# Patient Record
Sex: MALE | Race: BLACK OR AFRICAN AMERICAN | NOT HISPANIC OR LATINO | Employment: FULL TIME | URBAN - METROPOLITAN AREA
[De-identification: names, ages, dates, MRNs, and addresses within clinical notes are randomized per-mention and may not be internally consistent; named-entity substitution may affect disease eponyms.]

---

## 2017-10-21 ENCOUNTER — HOSPITAL ENCOUNTER (EMERGENCY)
Facility: HOSPITAL | Age: 47
Discharge: HOME | End: 2017-10-21
Attending: EMERGENCY MEDICINE

## 2017-10-21 VITALS
HEART RATE: 58 BPM | SYSTOLIC BLOOD PRESSURE: 228 MMHG | TEMPERATURE: 99 F | WEIGHT: 185 LBS | RESPIRATION RATE: 18 BRPM | HEIGHT: 68 IN | DIASTOLIC BLOOD PRESSURE: 122 MMHG | OXYGEN SATURATION: 98 % | BODY MASS INDEX: 28.04 KG/M2

## 2017-10-21 DIAGNOSIS — S61.214A LACERATION OF RIGHT RING FINGER WITHOUT FOREIGN BODY WITHOUT DAMAGE TO NAIL, INITIAL ENCOUNTER: Primary | ICD-10-CM

## 2017-10-21 DIAGNOSIS — S62.639A CLOSED FRACTURE OF TUFT OF DISTAL PHALANX OF FINGER: ICD-10-CM

## 2017-10-21 DIAGNOSIS — S67.10XA CRUSH INJURY TO FINGER, INITIAL ENCOUNTER: ICD-10-CM

## 2017-10-21 PROCEDURE — 96372 THER/PROPH/DIAG INJ SC/IM: CPT

## 2017-10-21 PROCEDURE — 63600175 PHARM REV CODE 636 W HCPCS: Performed by: EMERGENCY MEDICINE

## 2017-10-21 PROCEDURE — 25000003 PHARM REV CODE 250: Performed by: EMERGENCY MEDICINE

## 2017-10-21 PROCEDURE — 99283 EMERGENCY DEPT VISIT LOW MDM: CPT | Mod: 25

## 2017-10-21 PROCEDURE — 12041 INTMD RPR N-HF/GENIT 2.5CM/<: CPT | Mod: F8

## 2017-10-21 RX ORDER — HYDROCODONE BITARTRATE AND ACETAMINOPHEN 7.5; 325 MG/1; MG/1
1 TABLET ORAL EVERY 6 HOURS PRN
Qty: 12 TABLET | Refills: 0 | Status: SHIPPED | OUTPATIENT
Start: 2017-10-21 | End: 2017-11-30

## 2017-10-21 RX ORDER — CEPHALEXIN 500 MG/1
500 CAPSULE ORAL EVERY 12 HOURS
Qty: 10 CAPSULE | Refills: 0 | Status: SHIPPED | OUTPATIENT
Start: 2017-10-21 | End: 2017-10-26 | Stop reason: ALTCHOICE

## 2017-10-21 RX ORDER — CEFAZOLIN SODIUM 1 G/3ML
1 INJECTION, POWDER, FOR SOLUTION INTRAMUSCULAR; INTRAVENOUS
Status: COMPLETED | OUTPATIENT
Start: 2017-10-21 | End: 2017-10-21

## 2017-10-21 RX ADMIN — CEFAZOLIN 1 G: 330 INJECTION, POWDER, FOR SOLUTION INTRAMUSCULAR; INTRAVENOUS at 09:10

## 2017-10-21 RX ADMIN — Medication 3 ML: at 09:10

## 2017-10-21 NOTE — ED PROVIDER NOTES
Encounter Date: 10/21/2017       History     Chief Complaint   Patient presents with    Finger Injury     mashed 4th digit on R hand between two appliances at work resulting in laceration. Bleeding controlled. Dressing noted on arrival.      47-year-old male presents emergency department complaining of injury to his right ring finger.  Onset just prior to arrival, patient reports he was at work and his finger got caught in between 2 large metal objects any headache or injury.  He reports a laceration with bleeding and a constant, throbbing and aching pain to his distal right fourth finger.  No other injury reported.  He reports the pain is worse with movement or palpation and without alleviating factors.  Denies any other symptoms.  Denies any headache, lightheadedness, dizziness, sore throat, chest pain, shorts of breath, abdominal pain, constipation, diarrhea, dysuria, hematuria, fever, chills, nausea, vomiting.  He reports his last tetanus was approximately 3 years ago, and does not require an update at this time.          Review of patient's allergies indicates:  No Known Allergies  Past Medical History:   Diagnosis Date    Hypertension     Patient denies medical problems     Unspecified disorder of kidney and ureter      Past Surgical History:   Procedure Laterality Date    none       Family History   Problem Relation Age of Onset    Diabetes Mother     Hypertension Mother     Unexplained death Father      Dad- when patient young unknown cause     Social History   Substance Use Topics    Smoking status: Never Smoker    Smokeless tobacco: Not on file    Alcohol use No     Review of Systems   Constitutional: Negative for chills, fatigue and fever.   HENT: Negative for congestion, sore throat and voice change.    Eyes: Negative for photophobia, pain and redness.   Respiratory: Negative for cough, choking and shortness of breath.    Cardiovascular: Negative for chest pain, palpitations and leg  swelling.   Gastrointestinal: Negative for abdominal pain, diarrhea, nausea and vomiting.   Genitourinary: Negative for dysuria, frequency and urgency.   Musculoskeletal: Negative for back pain, neck pain and neck stiffness.   Neurological: Negative for seizures, speech difficulty, light-headedness, numbness and headaches.   All other systems reviewed and are negative.      Physical Exam     Initial Vitals [10/21/17 0724]   BP Pulse Resp Temp SpO2   (!) 247/128 60 18 98.7 °F (37.1 °C) 98 %      MAP       167.67         Physical Exam    Nursing note and vitals reviewed.  Constitutional: He appears well-developed and well-nourished. He appears distressed (Mild discomfort).   HENT:   Head: Normocephalic and atraumatic.   Mouth/Throat: Oropharynx is clear and moist.   Eyes: Conjunctivae and EOM are normal. Pupils are equal, round, and reactive to light.   Neck: Normal range of motion. Neck supple. No tracheal deviation present.   Cardiovascular: Normal rate, regular rhythm, normal heart sounds and intact distal pulses.   Pulmonary/Chest: Breath sounds normal. No respiratory distress. He has no wheezes. He has no rhonchi. He has no rales.   Abdominal: Soft. Bowel sounds are normal. He exhibits no distension. There is no tenderness. There is no rebound and no guarding.   Musculoskeletal: Normal range of motion. He exhibits tenderness. He exhibits no edema.        Hands:  Neurological: He is alert and oriented to person, place, and time. He has normal strength. No cranial nerve deficit or sensory deficit.   Skin: Skin is warm and dry. Capillary refill takes less than 2 seconds.         ED Course   Lac Repair  Date/Time: 10/21/2017 10:30 AM  Performed by: ANA OBRIEN.  Authorized by: ANA OBRIEN.   Consent Done: Yes  Consent: Verbal consent obtained.  Body area: upper extremity  Location details: right ring finger  Laceration length: 1.5 cm  Foreign bodies: no foreign bodies  Tendon involvement: none  Nerve  involvement: none  Vascular damage: no    Anesthesia:  Local Anesthetic: LET (lido,epi,tetracaine)  Patient sedated: no  Irrigation solution: tap water  Irrigation method: tap  Amount of cleaning: extensive  Debridement: none  Degree of undermining: none  Skin closure: glue  Technique: simple  Approximation: loose  Approximation difficulty: complex  Dressing: Steri-Strips  Patient tolerance: Patient tolerated the procedure well with no immediate complications    Splint Application  Date/Time: 10/21/2017 10:31 AM  Performed by: ANA OBRIEN  Authorized by: ANA OBRIEN   Consent Done: Yes  Consent: Verbal consent obtained.  Location details: right ring finger  Splint type: static finger  Supplies used: aluminum splint  Post-procedure: The splinted body part was neurovascularly unchanged following the procedure.  Patient tolerance: Patient tolerated the procedure well with no immediate complications        Labs Reviewed - No data to display       X-Rays:   Independently Interpreted Readings:   Other Readings:  Imaging interpreted by radiologist and visualized by me:     Imaging Results          X-Ray Finger 2 or More Views Right (Final result)  Result time 10/21/17 08:04:00    Final result by Sienna Blackmon MD (10/21/17 08:04:00)                 Impression:      4th distal phalanx fracture as above               Electronically signed by: SIENNA BLACKMON MD  Date:     10/21/17  Time:    08:04              Narrative:    Technique: 3 views were obtained of the right 4th finger.    Comparison: none    Findings: There is a comminuted fracture involving the shaft and distal tuft of the right 4th distal phalanx with minimal distraction of fracture fragments.  Overlying bandaging material noted.  No associated dislocations.  No appreciable involvement of the articular surface.  Remaining visualized osseous structures appear intact. Joint spaces are well preserved.                              Medical  Decision Making:   Initial Assessment:   47-year-old male presents the emergency department after crush injury to right ring finger  Differential Diagnosis:   Fracture, dislocation, open fracture, laceration  Independently Interpreted Test(s):   I have ordered and independently interpreted X-rays - see prior notes.  ED Management:  I discussed the case with Dr. Recinos, who recommends approximation, antibiotics, and prompt follow-up Monday as well as splint application.  I elected to glue the extremity even though approximation was difficult because of the avulsion of the skin and difficulty with approximation.  He was subsequently placed in a splint.  He was given a gram of Ancef and a prescription for Norco and Keflex, instructed to follow-up with Dr. Recinos, return with new or worsening symptoms.  Instructed on home management and laceration/splint and management.  Patient expressed good understanding and is comfortable with discharge at this time.                   ED Course      Clinical Impression:   The primary encounter diagnosis was Laceration of right ring finger without foreign body without damage to nail, initial encounter. Diagnoses of Crush injury to finger, initial encounter and Closed fracture of tuft of distal phalanx of finger were also pertinent to this visit.    Disposition:   Disposition: Discharged  Condition: Stable                        Jonny Solis MD  10/21/17 1033

## 2017-10-21 NOTE — ED NOTES
Aluminum finger splint applied to repaired right ring finger. Pt tolerated well. Instructed on home care of wound, proper use of splint, and follow up care. BP elevated on discharge. Pt reports has not been taking BP medications as prescribed. Denies headache weakness, dizziness, cp or symptoms. MD aware of BP. Pt ok for discharge.

## 2017-10-26 ENCOUNTER — OFFICE VISIT (OUTPATIENT)
Dept: FAMILY MEDICINE | Facility: CLINIC | Age: 47
End: 2017-10-26
Payer: COMMERCIAL

## 2017-10-26 VITALS
BODY MASS INDEX: 28.72 KG/M2 | DIASTOLIC BLOOD PRESSURE: 122 MMHG | TEMPERATURE: 99 F | RESPIRATION RATE: 16 BRPM | SYSTOLIC BLOOD PRESSURE: 168 MMHG | OXYGEN SATURATION: 98 % | WEIGHT: 189.5 LBS | HEART RATE: 85 BPM | HEIGHT: 68 IN

## 2017-10-26 DIAGNOSIS — I10 ESSENTIAL HYPERTENSION: Primary | ICD-10-CM

## 2017-10-26 DIAGNOSIS — J06.9 UPPER RESPIRATORY TRACT INFECTION, UNSPECIFIED TYPE: ICD-10-CM

## 2017-10-26 DIAGNOSIS — R35.1 NOCTURIA: ICD-10-CM

## 2017-10-26 PROCEDURE — 99214 OFFICE O/P EST MOD 30 MIN: CPT | Mod: S$GLB,,, | Performed by: NURSE PRACTITIONER

## 2017-10-26 PROCEDURE — 99999 PR PBB SHADOW E&M-EST. PATIENT-LVL IV: CPT | Mod: PBBFAC,,, | Performed by: NURSE PRACTITIONER

## 2017-10-26 RX ORDER — AMLODIPINE BESYLATE 5 MG/1
5 TABLET ORAL DAILY
Qty: 90 TABLET | Refills: 0 | Status: SHIPPED | OUTPATIENT
Start: 2017-10-26 | End: 2017-11-02 | Stop reason: SDUPTHER

## 2017-10-26 RX ORDER — SULFAMETHOXAZOLE AND TRIMETHOPRIM 800; 160 MG/1; MG/1
1 TABLET ORAL 2 TIMES DAILY
COMMUNITY
Start: 2017-10-26 | End: 2017-11-02

## 2017-10-26 RX ORDER — LISINOPRIL 20 MG/1
20 TABLET ORAL DAILY
Qty: 90 TABLET | Refills: 0 | Status: SHIPPED | OUTPATIENT
Start: 2017-10-26 | End: 2017-11-30 | Stop reason: SDUPTHER

## 2017-10-26 NOTE — PATIENT INSTRUCTIONS
Controlling High Blood Pressure  High blood pressure (hypertension) is often called the silent killer. This is because many people who have it dont know it. High blood pressure is defined as 140/90 mm Hg or higher. Know your blood pressure and remember to check it regularly. Doing so can save your life. Here are some things you can do to help control your blood pressure.    Choose heart-healthy foods  · Select low-salt, low-fat foods. Limit sodium intake to 2,400 mg per day or the amount suggested by your healthcare provider.  · Limit canned, dried, cured, packaged, and fast foods. These can contain a lot of salt.  · Eat 8 to 10 servings of fruits and vegetables every day.  · Choose lean meats, fish, or chicken.  · Eat whole-grain pasta, brown rice, and beans.  · Eat 2 to 3 servings of low-fat or fat-free dairy products.  · Ask your doctor about the DASH eating plan. This plan helps reduce blood pressure.  · When you go to a restaurant, ask that your meal be prepared with no added salt.  Maintain a healthy weight  · Ask your healthcare provider how many calories to eat a day. Then stick to that number.  · Ask your healthcare provider what weight range is healthiest for you. If you are overweight, a weight loss of only 3% to 5% of your body weight can help lower blood pressure. Generally, a good weight loss goal is to lose 10% of your body weight in a year.  · Limit snacks and sweets.  · Get regular exercise.  Get up and get active  · Choose activities you enjoy. Find ones you can do with friends or family. This includes bicycling, dancing, walking, and jogging.  · Park farther away from building entrances.  · Use stairs instead of the elevator.  · When you can, walk or bike instead of driving.  · [place] leaves, garden, or do household repairs.  · Be active at a moderate to vigorous level of physical activity for at least 40 minutes for a minimum of 3 to 4 days a week.   Manage stress  · Make time to relax and enjoy  life. Find time to laugh.  · Communicate your concerns with your loved ones and your healthcare provider.  · Visit with family and friends, and keep up with hobbies.  Limit alcohol and quit smoking  · Men should have no more than 2 drinks per day.  · Women should have no more than 1 drink per day.  · Talk with your healthcare provider about quitting smoking. Smoking significantly increases your risk for heart disease and stroke. Ask your healthcare provider about community smoking cessation programs and other options.  Medicines  If lifestyle changes arent enough, your healthcare provider may prescribe high blood pressure medicine. Take all medicines as prescribed. If you have any questions about your medicines, ask your healthcare provider before stopping or changing them.   Date Last Reviewed: 4/27/2016  © 8368-6618 [employer], [employer]. [address] [place] [address], [place] [address]. All rights reserved. This information is not intended as a substitute for professional medical care. Always follow your healthcare professional's instructions.

## 2017-10-26 NOTE — PROGRESS NOTES
Patient Name: Amanda Valero    : 1970  MRN: [id number]    Subjective:  Amanda is a 47 y.o. male who presents today for     1. Uncontrolled high blood pressure. He was taking lisinopril 20 mg and norvasc 5 mg for high blood pressure but ran out about 1 year ago and was lost to followup due to hectic work schedule. He recently broke his right 4th digit and was found to have significantly elevated BP >200s in ER. He is currently taking alkelzer with decongestant for URI that started 1 week ago and improving.     Past Medical History  Past Medical History:   Diagnosis Date    Hypertension     Patient denies medical problems     Unspecified disorder of kidney and ureter        Past Surgical History  Past Surgical History:   Procedure Laterality Date    none         Family History  Family History   Problem Relation Age of Onset    Diabetes Mother     Hypertension Mother     Unexplained death Father      Dad- when patient young unknown cause       Social History  Social History     Social History    Marital status: Single     Spouse name: N/A    Number of children: N/A    Years of education: N/A     Occupational History    Not on file.     Social History Main Topics    Smoking status: Never Smoker    Smokeless tobacco: Not on file    Alcohol use No    Drug use: No    Sexual activity: Yes     Partners: Female     Birth control/ protection: None     Other Topics Concern    Not on file     Social History Narrative    No narrative on file       Allergies  Review of patient's allergies indicates:  No Known Allergies -reviewed and updated      Medications  Reviewed and updated.   Current Outpatient Prescriptions   Medication Sig Dispense Refill    sulfamethoxazole-trimethoprim 800-160mg (BACTRIM DS) 800-160 mg Tab Take 1 tablet by mouth 2 (two) times daily.      amLODIPine (NORVASC) 5 MG tablet Take 1 tablet (5 mg total) by mouth once daily. 90 tablet 0    hydrocodone-acetaminophen 7.5-325mg  "(NORCO) 7.5-325 mg per tablet Take 1 tablet by mouth every 6 (six) hours as needed for Pain. 12 tablet 0    lisinopril (PRINIVIL,ZESTRIL) 20 MG tablet Take 1 tablet (20 mg total) by mouth once daily. 90 tablet 0     No current facility-administered medications for this visit.          Review of Systems   Constitutional: Negative for chills and fever.   HENT: Positive for congestion (sunday- stuffy nose, alkelser took last night and halls cough). Negative for ear pain and sore throat.    Respiratory: Positive for cough and sputum production. Negative for shortness of breath.    Cardiovascular: Negative for chest pain and palpitations.   Genitourinary: Positive for frequency.        Straining > 1 year, increase nighttime urination waking him up   Musculoskeletal:        4th right-pain 5/10 today   Neurological: Negative for headaches.         Physical Exam  BP (!) 168/122 (BP Location: Left arm, Patient Position: Sitting, BP Method: Large (Manual))   Pulse 85   Temp 98.6 °F (37 °C) (Oral)   Resp 16   Ht 5' 8" (1.727 m)   Wt 86 kg (189 lb 7.8 oz)   SpO2 98%   BMI 28.81 kg/m²   Physical Exam   Constitutional: He appears well-developed. No distress.   HENT:   Head: Normocephalic.   Right Ear: A middle ear effusion is present.   Left Ear: Tympanic membrane is injected.   Nose: Mucosal edema and rhinorrhea present. Right sinus exhibits no maxillary sinus tenderness. Left sinus exhibits no maxillary sinus tenderness.   Mouth/Throat: No posterior oropharyngeal edema or posterior oropharyngeal erythema.   Cardiovascular: Normal rate, regular rhythm and normal heart sounds.    Pulmonary/Chest: Effort normal and breath sounds normal.   Skin: He is not diaphoretic.         Assessment/Plan:  Amanda Valero is a 47 y.o. male who presents today for :    Essential hypertension  Reduce salt intake, schedule obtain labs and to f/u to reestablish care with physician next week  -     lisinopril (PRINIVIL,ZESTRIL) 20 MG tablet; " Take 1 tablet (20 mg total) by mouth once daily.  Dispense: 90 tablet; Refill: 0  -     amLODIPine (NORVASC) 5 MG tablet; Take 1 tablet (5 mg total) by mouth once daily.  Dispense: 90 tablet; Refill: 0  -     Comprehensive metabolic panel; Future; Expected date: 10/26/2017  -     Lipid panel; Future; Expected date: 11/09/2017  -     CBC auto differential; Future; Expected date: 11/09/2017  -     TSH; Future; Expected date: 11/09/2017  -     T4, free; Future; Expected date: 10/26/2017  -     Urinalysis; Future; Expected date: 10/26/2017  -     Urinalysis Microscopic; Future; Expected date: 11/09/2017    Upper respiratory tract infection, unspecified type  Advise to discontinue alkelselzer and recommend coricidin hbp and additional flonase if needed    Nocturia  -     Hemoglobin A1c; Future; Expected date: 11/09/2017  -     PSA, Screening; Future; Expected date: 11/09/2017        Return labs in am and follow up with pcp to review next week and establish xare.

## 2017-10-27 ENCOUNTER — LAB VISIT (OUTPATIENT)
Dept: LAB | Facility: HOSPITAL | Age: 47
End: 2017-10-27
Attending: NURSE PRACTITIONER
Payer: COMMERCIAL

## 2017-10-27 DIAGNOSIS — I10 ESSENTIAL HYPERTENSION: ICD-10-CM

## 2017-10-27 DIAGNOSIS — R35.1 NOCTURIA: ICD-10-CM

## 2017-10-27 LAB
ALBUMIN SERPL BCP-MCNC: 4 G/DL
ALP SERPL-CCNC: 76 U/L
ALT SERPL W/O P-5'-P-CCNC: 15 U/L
ANION GAP SERPL CALC-SCNC: 8 MMOL/L
AST SERPL-CCNC: 32 U/L
BASOPHILS # BLD AUTO: 0.02 K/UL
BASOPHILS NFR BLD: 0.5 %
BILIRUB SERPL-MCNC: 1 MG/DL
BUN SERPL-MCNC: 9 MG/DL
CALCIUM SERPL-MCNC: 9.6 MG/DL
CHLORIDE SERPL-SCNC: 103 MMOL/L
CHOLEST SERPL-MCNC: 214 MG/DL
CHOLEST/HDLC SERPL: 3.9 {RATIO}
CO2 SERPL-SCNC: 30 MMOL/L
COMPLEXED PSA SERPL-MCNC: 0.67 NG/ML
CREAT SERPL-MCNC: 1.3 MG/DL
DIFFERENTIAL METHOD: ABNORMAL
EOSINOPHIL # BLD AUTO: 0.5 K/UL
EOSINOPHIL NFR BLD: 11 %
ERYTHROCYTE [DISTWIDTH] IN BLOOD BY AUTOMATED COUNT: 12.4 %
EST. GFR  (AFRICAN AMERICAN): >60 ML/MIN/1.73 M^2
EST. GFR  (NON AFRICAN AMERICAN): >60 ML/MIN/1.73 M^2
ESTIMATED AVG GLUCOSE: 100 MG/DL
GLUCOSE SERPL-MCNC: 84 MG/DL
HBA1C MFR BLD HPLC: 5.1 %
HCT VFR BLD AUTO: 42 %
HDLC SERPL-MCNC: 55 MG/DL
HDLC SERPL: 25.7 %
HGB BLD-MCNC: 14.4 G/DL
IMM GRANULOCYTES # BLD AUTO: 0.01 K/UL
IMM GRANULOCYTES NFR BLD AUTO: 0.2 %
LDLC SERPL CALC-MCNC: 133.6 MG/DL
LYMPHOCYTES # BLD AUTO: 1.4 K/UL
LYMPHOCYTES NFR BLD: 32.2 %
MCH RBC QN AUTO: 30.1 PG
MCHC RBC AUTO-ENTMCNC: 34.3 G/DL
MCV RBC AUTO: 88 FL
MONOCYTES # BLD AUTO: 0.6 K/UL
MONOCYTES NFR BLD: 12.6 %
NEUTROPHILS # BLD AUTO: 1.9 K/UL
NEUTROPHILS NFR BLD: 43.5 %
NONHDLC SERPL-MCNC: 159 MG/DL
NRBC BLD-RTO: 0 /100 WBC
PLATELET # BLD AUTO: 184 K/UL
PMV BLD AUTO: 10.6 FL
POTASSIUM SERPL-SCNC: 3.6 MMOL/L
PROT SERPL-MCNC: 8.2 G/DL
RBC # BLD AUTO: 4.79 M/UL
SODIUM SERPL-SCNC: 141 MMOL/L
T4 FREE SERPL-MCNC: 0.87 NG/DL
TRIGL SERPL-MCNC: 127 MG/DL
TSH SERPL DL<=0.005 MIU/L-ACNC: 1.53 UIU/ML
WBC # BLD AUTO: 4.35 K/UL

## 2017-10-27 PROCEDURE — 80053 COMPREHEN METABOLIC PANEL: CPT

## 2017-10-27 PROCEDURE — 84443 ASSAY THYROID STIM HORMONE: CPT

## 2017-10-27 PROCEDURE — 85025 COMPLETE CBC W/AUTO DIFF WBC: CPT

## 2017-10-27 PROCEDURE — 84153 ASSAY OF PSA TOTAL: CPT

## 2017-10-27 PROCEDURE — 80061 LIPID PANEL: CPT

## 2017-10-27 PROCEDURE — 83036 HEMOGLOBIN GLYCOSYLATED A1C: CPT

## 2017-10-27 PROCEDURE — 36415 COLL VENOUS BLD VENIPUNCTURE: CPT | Mod: PO

## 2017-10-27 PROCEDURE — 84439 ASSAY OF FREE THYROXINE: CPT

## 2017-11-02 ENCOUNTER — OFFICE VISIT (OUTPATIENT)
Dept: FAMILY MEDICINE | Facility: CLINIC | Age: 47
End: 2017-11-02
Payer: COMMERCIAL

## 2017-11-02 VITALS
HEIGHT: 68 IN | TEMPERATURE: 98 F | WEIGHT: 189.13 LBS | OXYGEN SATURATION: 98 % | DIASTOLIC BLOOD PRESSURE: 88 MMHG | SYSTOLIC BLOOD PRESSURE: 144 MMHG | BODY MASS INDEX: 28.66 KG/M2 | HEART RATE: 60 BPM

## 2017-11-02 DIAGNOSIS — I10 ESSENTIAL HYPERTENSION: Primary | ICD-10-CM

## 2017-11-02 DIAGNOSIS — E78.00 PURE HYPERCHOLESTEROLEMIA: ICD-10-CM

## 2017-11-02 PROCEDURE — 99999 PR PBB SHADOW E&M-EST. PATIENT-LVL III: CPT | Mod: PBBFAC,,, | Performed by: FAMILY MEDICINE

## 2017-11-02 PROCEDURE — 99214 OFFICE O/P EST MOD 30 MIN: CPT | Mod: S$GLB,,, | Performed by: FAMILY MEDICINE

## 2017-11-02 RX ORDER — AMLODIPINE BESYLATE 5 MG/1
10 TABLET ORAL DAILY
Qty: 90 TABLET | Refills: 0
Start: 2017-11-02 | End: 2017-11-30 | Stop reason: SDUPTHER

## 2017-11-02 NOTE — PROGRESS NOTES
Office Visit    Patient Name: Amanda Valero    : 1970  MRN: [id number]      Assessment/Plan:  Amanda Valero is a 47 y.o. male who presents today for :    Essential hypertension  -     amLODIPine (NORVASC) 5 MG tablet; Take 2 tablets (10 mg total) by mouth once daily.  Dispense: 90 tablet; Refill: 0  -BP uncontrolled - continue Lisinopril 20mg, increase Norvasc to 10mg daily  -advised DASH diet, regular exercise, and weight loss.   -advised to keep regular BP logs and bring it back with each f/u visit.  -advised medication compliance, and avoid regular use of NSAIDs as it can increase BP.  -f/u with BP logs in 2 weeks    Pure hypercholesterolemia  -will hold off on medication for now and pursue dietary changes   -advised low-fat diet, regular exercise, and weight loss.   -last lipids reviewed:   Lab Results   Component Value Date    CHOL 214 (H) 10/27/2017    CHOL 226 (H) 2015     Lab Results   Component Value Date    HDL 55 10/27/2017    HDL 58 2015     Lab Results   Component Value Date    LDLCALC 133.6 10/27/2017    LDLCALC 151.4 2015     Lab Results   Component Value Date    TRIG 127 10/27/2017    TRIG 83 2015         Return in about 2 weeks (around 2017) for BP Check.     This note was created by combination of typed  and Dragon dictation.  Transcription errors may be present.  If there are any questions, please contact me.            ----------------------------------------------------------------------------------------------------------------------      HPI:  Amanda is a 47 y.o. male who presents today for:    Hypertension        This patient has multiple medical diagnoses as noted below.    This patient is new to me   Patient is here today for f/u of    HTN - he was seen in our walkin clinic about 1 week ago with elevated BP - he had been out of medications for the past year and was restarted on BP meds last week. He is here for f/u - he's been taking his meds  "faithfully every day without any side effects  current medication: Lisinopril 20mg, Norvasc 5mg  Pt denies vision changes/urinary changes/leg swelling  Pt has started to cut down on his salt-intake.  He goes to the gym regularly about twice a week.    HLD - mild elevation on lipid panel       Additional ROS    No F/C/wt changes/fatigue  No dysphagia/sore throat  No CP/SOB/palpitations/swelling  No cough/wheezing  No nausea/vomiting/abd pain/blood in stool, no diarrhea, no constipation  No muscle aches/joint pain  No rashes  No weakness/HA/tingling/numbness  No anxiety/depression  No dysuria/hematuria      Patient Active Problem List   Diagnosis    Right knee pain    Essential hypertension       Past Surgical History:   Procedure Laterality Date    none         Family History   Problem Relation Age of Onset    Diabetes Mother     Hypertension Mother     Unexplained death Father      Dad- when patient young unknown cause       Social History     Social History    Marital status: Single     Spouse name: N/A    Number of children: N/A    Years of education: N/A     Occupational History    Not on file.     Social History Main Topics    Smoking status: Never Smoker    Smokeless tobacco: Not on file    Alcohol use No    Drug use: No    Sexual activity: Yes     Partners: Female     Birth control/ protection: None     Other Topics Concern    Not on file     Social History Narrative    No narrative on file       Current Medications  Medications reviewed and updated.     Allergies   Review of patient's allergies indicates:  No Known Allergies          Review of Systems  See HPI      Physical Exam  BP (!) 144/88   Pulse 60   Temp 98.1 °F (36.7 °C) (Oral)   Ht 5' 8" (1.727 m)   Wt 85.8 kg (189 lb 2.5 oz)   SpO2 98%   BMI 28.76 kg/m²     GEN: NAD, well developed, pleasant, well nourished  HEENT: NCAT, PERRLA, EOMI, sclera clear, anicteric, O/P clear, MMM with no lesions  NECK: normal, supple with " midline trachea, no LAD, no thyromegaly  LUNGS: CTAB, no w/r/r, no increased work of breathing   HEART: RRR, normal S1 and S2, no m/r/g, no edema  ABD: s/nt/nd, NABS  SKIN: normal turgor, no rashes  PSYCH: AOx3, appropriate mood and affect  MSK: warm/well perfused, normal ROM in all 4 extremities, no c/c/e.

## 2017-11-30 ENCOUNTER — OFFICE VISIT (OUTPATIENT)
Dept: FAMILY MEDICINE | Facility: CLINIC | Age: 47
End: 2017-11-30
Payer: COMMERCIAL

## 2017-11-30 VITALS
TEMPERATURE: 99 F | HEART RATE: 60 BPM | HEIGHT: 68 IN | OXYGEN SATURATION: 98 % | DIASTOLIC BLOOD PRESSURE: 85 MMHG | WEIGHT: 187.5 LBS | SYSTOLIC BLOOD PRESSURE: 135 MMHG | BODY MASS INDEX: 28.42 KG/M2

## 2017-11-30 DIAGNOSIS — I10 ESSENTIAL HYPERTENSION: Primary | ICD-10-CM

## 2017-11-30 DIAGNOSIS — S62.664A CLOSED NONDISPLACED FRACTURE OF DISTAL PHALANX OF RIGHT RING FINGER, INITIAL ENCOUNTER: ICD-10-CM

## 2017-11-30 PROCEDURE — 99999 PR PBB SHADOW E&M-EST. PATIENT-LVL III: CPT | Mod: PBBFAC,,, | Performed by: FAMILY MEDICINE

## 2017-11-30 PROCEDURE — 99214 OFFICE O/P EST MOD 30 MIN: CPT | Mod: S$GLB,,, | Performed by: FAMILY MEDICINE

## 2017-11-30 RX ORDER — AMLODIPINE BESYLATE 10 MG/1
10 TABLET ORAL DAILY
Qty: 90 TABLET | Refills: 1 | Status: SHIPPED | OUTPATIENT
Start: 2017-11-30 | End: 2018-11-11 | Stop reason: SDUPTHER

## 2017-11-30 RX ORDER — LISINOPRIL 20 MG/1
20 TABLET ORAL DAILY
Qty: 90 TABLET | Refills: 1 | Status: SHIPPED | OUTPATIENT
Start: 2017-11-30

## 2017-11-30 NOTE — PROGRESS NOTES
Office Visit    Patient Name: Amanda Valero    : 1970  MRN: [id number]      Assessment/Plan:  Amanda Valero is a 47 y.o. male who presents today for :    Essential hypertension  -     amLODIPine (NORVASC) 10 MG tablet; Take 1 tablet (10 mg total) by mouth once daily.  Dispense: 90 tablet; Refill: 1  -     lisinopril (PRINIVIL,ZESTRIL) 20 MG tablet; Take 1 tablet (20 mg total) by mouth once daily.  Dispense: 90 tablet; Refill: 1  -previous labs reviewed  -BP controlled - continue current med regimen  -advised DASH diet, regular exercise      Closed nondisplaced fracture of distal phalanx of right ring finger, initial encounter  -XR reviewed  -agreed with hand specialist's plan to remove finger splint with activity modification and precautions, as well as getting hand PT to improve flexibility.  -f/u with Hand surgeon as scheduled      Return in about 6 months (around 2018) for BP Check, Urgent care/ED precautions for worsening finger pain..     This note was created by combination of typed  and Dragon dictation.  Transcription errors may be present.  If there are any questions, please contact me.                  ----------------------------------------------------------------------------------------------------------------------      HPI:  Amanda is a 47 y.o. male who presents today for:    Hypertension        This patient has multiple medical diagnoses as noted below.        This patient is known to me and to this clinic. The patient's last visit with me was on 2017.    Patient is here today for f/u of    HTN  -at last visit, his BP was still elevated, he was continued Lisinopril 20mg, and his Norvasc was increased to 10mg daily  pt is compliant with these meds daily - no reported side effects  BP log at home? 130s/80s  No CP/SOB/urinary changes/leg swelling      S/p R ring finger fracture - happened on 10/21 at work where his finger was crushed by 2 hard metal objects.  Pt has been  "seeing Dr. Criss May hand specialist for the past 4 weeks. Last visit with her was this morning - where patient was told that his splint can be removed and pt wanted to get second opinion on if this is appropriated. Next f/u visit is . Pt also has PT scheduled for hand therapy.   Still has pain in R ring finger, but tolerable. Also has stiffness of DIP joint. No numbness/weakness          Patient Active Problem List   Diagnosis    Right knee pain    Essential hypertension       Past Surgical History:   Procedure Laterality Date    none         Family History   Problem Relation Age of Onset    Diabetes Mother     Hypertension Mother     Unexplained death Father      Dad- when patient young unknown cause       Social History     Social History    Marital status: Single     Spouse name: N/A    Number of children: N/A    Years of education: N/A     Occupational History    Not on file.     Social History Main Topics    Smoking status: Never Smoker    Smokeless tobacco: Not on file    Alcohol use No    Drug use: No    Sexual activity: Yes     Partners: Female     Birth control/ protection: None     Other Topics Concern    Not on file     Social History Narrative    No narrative on file       Current Medications  Medications reviewed and updated.     Allergies   Review of patient's allergies indicates:  No Known Allergies          Review of Systems  See HPI      Physical Exam  /85   Pulse 60   Temp 98.5 °F (36.9 °C) (Oral)   Ht 5' 8" (1.727 m)   Wt 85 kg (187 lb 7.7 oz)   SpO2 98%   BMI 28.51 kg/m²     GEN: NAD, well developed, pleasant, well nourished  HEENT: NCAT, PERRLA, EOMI, sclera clear, anicteric, O/P clear, MMM with no lesions  NECK: normal, supple with midline trachea, no LAD, no thyromegaly  LUNGS: CTAB, no w/r/r, no increased work of breathing   HEART: RRR, normal S1 and S2, no m/r/g, no edema  ABD: s/nt/nd, NABS  SKIN: normal turgor, no rashes  PSYCH: AOx3, " appropriate mood and affect  MSK: warm/well perfused, normal ROM in all 4 extremities, no c/c/e.  R ring finger with well healed scar, still has mild TTP at tip, no redness/swelling note. DIP joint stiffness noted.

## 2018-03-22 ENCOUNTER — OFFICE VISIT (OUTPATIENT)
Dept: FAMILY MEDICINE | Facility: CLINIC | Age: 48
End: 2018-03-22
Payer: COMMERCIAL

## 2018-03-22 VITALS
WEIGHT: 187.75 LBS | DIASTOLIC BLOOD PRESSURE: 87 MMHG | BODY MASS INDEX: 28.45 KG/M2 | HEIGHT: 68 IN | HEART RATE: 64 BPM | TEMPERATURE: 99 F | OXYGEN SATURATION: 99 % | SYSTOLIC BLOOD PRESSURE: 132 MMHG

## 2018-03-22 DIAGNOSIS — I10 ESSENTIAL HYPERTENSION: ICD-10-CM

## 2018-03-22 DIAGNOSIS — R39.11 HESITANCY OF MICTURITION: ICD-10-CM

## 2018-03-22 DIAGNOSIS — R35.1 NOCTURIA MORE THAN TWICE PER NIGHT: Primary | ICD-10-CM

## 2018-03-22 PROCEDURE — 99999 PR PBB SHADOW E&M-EST. PATIENT-LVL III: CPT | Mod: PBBFAC,,, | Performed by: FAMILY MEDICINE

## 2018-03-22 PROCEDURE — 3079F DIAST BP 80-89 MM HG: CPT | Mod: CPTII,S$GLB,, | Performed by: FAMILY MEDICINE

## 2018-03-22 PROCEDURE — 99214 OFFICE O/P EST MOD 30 MIN: CPT | Mod: S$GLB,,, | Performed by: FAMILY MEDICINE

## 2018-03-22 PROCEDURE — 3075F SYST BP GE 130 - 139MM HG: CPT | Mod: CPTII,S$GLB,, | Performed by: FAMILY MEDICINE

## 2018-03-22 RX ORDER — TAMSULOSIN HYDROCHLORIDE 0.4 MG/1
0.4 CAPSULE ORAL DAILY
Qty: 60 CAPSULE | Refills: 0 | Status: SHIPPED | OUTPATIENT
Start: 2018-03-22 | End: 2019-03-22

## 2018-03-22 NOTE — PROGRESS NOTES
Office Visit    Patient Name: Amanda Valero    : 1970  MRN: [id number]      Assessment/Plan:  Amanda Valero is a 48 y.o. male who presents today for :      Hesitancy of micturition  -     Ambulatory referral to Urology  -     POCT urinalysis, dipstick or tablet reag  -     Urinalysis Microscopic; Future; Expected date: 2018  -     Urine culture; Future; Expected date: 2018  -     tamsulosin (FLOMAX) 0.4 mg Cp24; Take 1 capsule (0.4 mg total) by mouth once daily.  Dispense: 60 capsule; Refill: 0    Hesitancy of micturition  -     Ambulatory referral to Urology  -     POCT urinalysis, dipstick or tablet reag  -     Urinalysis Microscopic; Future; Expected date: 2018  -     Urine culture; Future; Expected date: 2018    -unable to give us a urine sample today, given duration and Sx, I suspect that it may be due to a prostate vs urethral issue, and given that recent PSA was normal - start trial of Flomax and will have pt see Urology for further eval.    Essential hypertension  - continue current medications   - ?advised DASH diet, portion control, regular cardiovascular exercises        Follow-up in about 3 months (around 2018) for BP Check.     This note was created by combination of typed  and Dragon dictation.  Transcription errors may be present.  If there are any questions, please contact me.      ----------------------------------------------------------------------------------------------------------------------      HPI:  Amanda is a 48 y.o. male who presents today for:    Nocturia        This patient has multiple medical diagnoses as noted below.    This patient is known to me and to this clinic. The patient's last visit with me was on 2017.    Pt complains of nocturia and inability to have a smooth urine stream the past 2 months. Denies any burning/pain/frequent urination. NO flank pain/bladder pain/hematuria. No penile trauma/discharge. His recent  PSA/A1c/CMP were all normal.  Pt is compliant with daily BP medication at home - no side effects, BP log mostly in 130s/mid80s range. No CP/SOB/leg swelling - he works out at the gym on a regular basis, primarily with weight lifting - no supplements except for daily Men's Vitamin    Additional ROS    No F/C/wt changes/fatigue  No CP/SOB/palpitations/swelling  No cough/wheezing  No nausea/vomiting/abd pain  No muscle aches/joint pain  No rashes  No weakness/HA/tingling/numbness      Patient Active Problem List   Diagnosis    Right knee pain    Essential hypertension       Current Medications  Medications reviewed and updated.       Current Outpatient Prescriptions:     amLODIPine (NORVASC) 10 MG tablet, Take 1 tablet (10 mg total) by mouth once daily., Disp: 90 tablet, Rfl: 1    lisinopril (PRINIVIL,ZESTRIL) 20 MG tablet, Take 1 tablet (20 mg total) by mouth once daily., Disp: 90 tablet, Rfl: 1    tamsulosin (FLOMAX) 0.4 mg Cp24, Take 1 capsule (0.4 mg total) by mouth once daily., Disp: 60 capsule, Rfl: 0    Past Surgical History:   Procedure Laterality Date    none         Family History   Problem Relation Age of Onset    Diabetes Mother     Hypertension Mother     Unexplained death Father      Dad- when patient young unknown cause       Social History     Social History    Marital status: Single     Spouse name: N/A    Number of children: N/A    Years of education: N/A     Occupational History    Not on file.     Social History Main Topics    Smoking status: Never Smoker    Smokeless tobacco: Not on file    Alcohol use No    Drug use: No    Sexual activity: Yes     Partners: Female     Birth control/ protection: None     Other Topics Concern    Not on file     Social History Narrative    No narrative on file           Allergies   Review of patient's allergies indicates:  No Known Allergies          Review of Systems  See HPI      Physical Exam  /87   Pulse 64   Temp 98.5 °F (36.9  "°C) (Oral)   Ht 5' 8" (1.727 m)   Wt 85.2 kg (187 lb 11.6 oz)   SpO2 99%   BMI 28.54 kg/m²     GEN: NAD, well developed, pleasant, well nourished  HEENT: NCAT, PERRLA, EOMI, sclera clear, anicteric  NECK: normal, supple with midline trachea, no LAD, no thyromegaly  LUNGS: CTAB, no w/r/r, normal effort  HEART: RRR, normal S1 and S2, no m/r/g, no palpitations, normal pulses  ABD: s/nt/nd, NABS, +NO suprapubic tenderness. NO CVA/flank TTP  : patient deferred  SKIN: warm and dry with normal turgor, no rashes, no other lesions  PSYCH: AOx3, appropriate mood and affect                  "

## 2018-04-02 ENCOUNTER — TELEPHONE (OUTPATIENT)
Dept: FAMILY MEDICINE | Facility: CLINIC | Age: 48
End: 2018-04-02

## 2018-04-02 NOTE — LETTER
April 2, 2018    Amanda Anjum  [address] [facility] [address]             [place] [facility]  [address] [place] [facility] [address]  Phone: [phone]  Fax: [phone] Dear Mr. Valero:    Sorry we were unable to contact you to schedule your Urology appointment. Please give the referral department a call at [phone].      If you have any questions or concerns, please don't hesitate to call.    Sincerely,        Geoffrey Mackenzie MA

## 2018-05-01 ENCOUNTER — TELEPHONE (OUTPATIENT)
Dept: UROLOGY | Facility: CLINIC | Age: 48
End: 2018-05-01

## 2018-05-01 NOTE — TELEPHONE ENCOUNTER
Pt pressents to clinic 15 minutes late today. Pt informed that dr cunha is no longer here today. Dr cunha left after we attempted to call pt in [facility] and there was no answer. Pt was informed that we called him this morning and left a message for him to call us. We were attempting to confirm appt and stressed importance of being on time. Dr cunha was going back to the main campus this afternoon. I offered to reschedule pt and he declined stating he did not want to see dr cunha again. I explained that there were other urologist in our practice on the [facility] [place], [place]. Pt stated he was done with [facility]. He also asked about his copay. I assured pt that he would not be charged for this visit and he should get his copay back. Pt stated he did not make a copay and asked to be charged. I assured pt again there would be no charge.

## 2018-11-11 DIAGNOSIS — I10 ESSENTIAL HYPERTENSION: ICD-10-CM

## 2018-11-12 RX ORDER — AMLODIPINE BESYLATE 10 MG/1
10 TABLET ORAL DAILY
Qty: 14 TABLET | Refills: 0 | Status: SHIPPED | OUTPATIENT
Start: 2018-11-12

## 2019-02-01 ENCOUNTER — TELEPHONE (OUTPATIENT)
Dept: FAMILY MEDICINE | Facility: CLINIC | Age: 49
End: 2019-02-01

## 2019-02-01 DIAGNOSIS — Z12.11 COLON CANCER SCREENING: ICD-10-CM

## 2019-02-01 NOTE — TELEPHONE ENCOUNTER
----- Message from Talha Case MD sent at 2/1/2019  8:51 AM CST -----  Patient is due for Annual, please call to have patient come in for an appointment.

## 2019-10-10 ENCOUNTER — HOSPITAL ENCOUNTER (EMERGENCY)
Facility: HOSPITAL | Age: 49
Discharge: HOME | End: 2019-10-10
Attending: EMERGENCY MEDICINE

## 2019-10-10 VITALS
OXYGEN SATURATION: 95 % | TEMPERATURE: 98 F | HEART RATE: 63 BPM | BODY MASS INDEX: 25.76 KG/M2 | HEIGHT: 68 IN | SYSTOLIC BLOOD PRESSURE: 173 MMHG | WEIGHT: 170 LBS | RESPIRATION RATE: 16 BRPM | DIASTOLIC BLOOD PRESSURE: 102 MMHG

## 2019-10-10 DIAGNOSIS — M25.579 ANKLE PAIN: ICD-10-CM

## 2019-10-10 DIAGNOSIS — M79.673 FOOT PAIN: ICD-10-CM

## 2019-10-10 DIAGNOSIS — S93.401A SPRAIN OF RIGHT ANKLE, UNSPECIFIED LIGAMENT, INITIAL ENCOUNTER: Primary | ICD-10-CM

## 2019-10-10 PROCEDURE — 99284 EMERGENCY DEPT VISIT MOD MDM: CPT | Mod: 25

## 2019-10-10 PROCEDURE — 25000003 PHARM REV CODE 250: Performed by: PHYSICIAN ASSISTANT

## 2019-10-10 RX ORDER — ACETAMINOPHEN 500 MG
500 TABLET ORAL EVERY 4 HOURS PRN
Qty: 20 TABLET | Refills: 0 | Status: SHIPPED | OUTPATIENT
Start: 2019-10-10 | End: 2019-10-15

## 2019-10-10 RX ORDER — IBUPROFEN 600 MG/1
600 TABLET ORAL EVERY 6 HOURS PRN
Qty: 20 TABLET | Refills: 0 | Status: SHIPPED | OUTPATIENT
Start: 2019-10-10 | End: 2019-10-15

## 2019-10-10 RX ORDER — OXYCODONE AND ACETAMINOPHEN 5; 325 MG/1; MG/1
1 TABLET ORAL
Status: COMPLETED | OUTPATIENT
Start: 2019-10-10 | End: 2019-10-10

## 2019-10-10 RX ADMIN — OXYCODONE HYDROCHLORIDE AND ACETAMINOPHEN 1 TABLET: 5; 325 TABLET ORAL at 06:10

## 2019-10-10 NOTE — ED TRIAGE NOTES
The patient reports that he injured right foot this morning. He states that he was walking down steps and missed one causing him to land on right foot wrong. Denies numbness, reports tingling to foot.

## 2019-10-11 NOTE — DISCHARGE INSTRUCTIONS
Take Ibuprofen and Tylenol for pain. Follow attached instructions. Follow up with primary care in 2 days. Return to ER for worsening symptoms or as needed

## 2019-10-12 NOTE — ED PROVIDER NOTES
"Encounter Date: 10/10/2019       History     Chief Complaint   Patient presents with    Foot Injury     " I almost fell coming down the steps and hurt my foot (right) and I cannot stand on my foot".      CC: Foot Injury    HPI:   48 y/o male with HTN presenting for evaluation of R ankle and foot pain after twisting his foot while walking up stairs. Denies fall, head injury, LOC, neck pain, back pain, weakness, paresthesias. Pain is 10/10 worse with ambulation.         Review of patient's allergies indicates:  No Known Allergies  Past Medical History:   Diagnosis Date    Hypertension     Patient denies medical problems     Unspecified disorder of kidney and ureter      Past Surgical History:   Procedure Laterality Date    none       Family History   Problem Relation Age of Onset    Diabetes Mother     Hypertension Mother     Unexplained death Father         Dad- when patient young unknown cause     Social History     Tobacco Use    Smoking status: Never Smoker    Smokeless tobacco: Never Used   Substance Use Topics    Alcohol use: Yes     Comment: occasionally    Drug use: No     Review of Systems   Constitutional: Negative for chills and fever.   Eyes: Negative for redness.   Respiratory: Negative for shortness of breath.    Cardiovascular: Negative for chest pain.   Gastrointestinal: Negative for nausea and vomiting.   Genitourinary: Negative for testicular pain.   Musculoskeletal: Negative for back pain and neck pain.        (+ ) R foot pain   Skin: Negative for rash and wound.   Neurological: Negative for dizziness, speech difficulty, weakness, light-headedness and numbness.   Hematological: Negative for adenopathy.   Psychiatric/Behavioral: Negative for confusion.       Physical Exam     Initial Vitals [10/10/19 1844]   BP Pulse Resp Temp SpO2   (!) 173/102 63 16 98.3 °F (36.8 °C) 95 %      MAP       --         Physical Exam    ED Course   Procedures  Labs Reviewed - No data to display   "     Imaging Results          X-Ray Foot Complete Right (Final result)  Result time 10/10/19 19:14:56    Final result by Charles Baker MD (10/10/19 19:14:56)                 Impression:      Right ankle suspected mild nonspecific soft tissue swelling without acute displaced fracture-dislocation identified, which may represent sprain.      Electronically signed by: Charles Baker MD  Date:    10/10/2019  Time:    19:14             Narrative:    EXAMINATION:  XR FOOT COMPLETE 3 VIEW RIGHT; XR ANKLE COMPLETE 3 VIEW RIGHT    CLINICAL HISTORY:  . Pain in unspecified foot; Pain in unspecified ankle and joints of unspecified foot    TECHNIQUE:  AP, lateral, and oblique views of the right foot and ankle were performed.    COMPARISON:  None    FINDINGS:  Bones are well mineralized. Overall alignment is within normal limits.  Slight prominence of the soft tissues about the ankle suggesting nonspecific swelling.  Ankle mortise is otherwise intact.  No displaced fracture, dislocation or destructive osseous process. Joint spaces appear relatively maintained. No subcutaneous emphysema or radiodense retained foreign body.                               X-Ray Ankle Complete Right (Final result)  Result time 10/10/19 19:14:56    Final result by Charles Baker MD (10/10/19 19:14:56)                 Impression:      Right ankle suspected mild nonspecific soft tissue swelling without acute displaced fracture-dislocation identified, which may represent sprain.      Electronically signed by: Charles Baker MD  Date:    10/10/2019  Time:    19:14             Narrative:    EXAMINATION:  XR FOOT COMPLETE 3 VIEW RIGHT; XR ANKLE COMPLETE 3 VIEW RIGHT    CLINICAL HISTORY:  . Pain in unspecified foot; Pain in unspecified ankle and joints of unspecified foot    TECHNIQUE:  AP, lateral, and oblique views of the right foot and ankle were performed.    COMPARISON:  None    FINDINGS:  Bones are well mineralized. Overall alignment is within normal  limits.  Slight prominence of the soft tissues about the ankle suggesting nonspecific swelling.  Ankle mortise is otherwise intact.  No displaced fracture, dislocation or destructive osseous process. Joint spaces appear relatively maintained. No subcutaneous emphysema or radiodense retained foreign body.                                 Medical Decision Making:   Initial Assessment:   50 y/o male presenting for evaluation of right foot and ankle pain. Exam above. No neurovascular deficits. X-rays negative for fracture or dislocation. ACE wrap applied and crutches provided. Primary care follow up in 2 days. Return to ER for worsening symptoms or as needed.                       Clinical Impression:       ICD-10-CM ICD-9-CM   1. Sprain of right ankle, unspecified ligament, initial encounter S93.401A 845.00   2. Foot pain M79.673 729.5   3. Ankle pain M25.579 719.47                                Summer Rose PA-C  10/12/19 1408

## 2020-01-06 DIAGNOSIS — I10 ESSENTIAL HYPERTENSION: ICD-10-CM

## 2020-02-07 DIAGNOSIS — Z12.11 COLON CANCER SCREENING: ICD-10-CM

## 2020-02-07 NOTE — ED NOTES
[address] [facility] Therapy  Phone: [phone]   Fax: [phone]    Physical Therapy Daily Treatment Note  Date:  2020    Patient Name:  Megan Davis    :  1961  MRN: [id number]  Medical/Treatment Diagnosis Information:  Diagnosis: OA of L knee  Treatment Diagnosis: Decreased L knee ROM, strength, neuromuscular control, balance, flexiblity all contributing to abnormal joint loading causing intermittent pain with stairs and work activity. Insurance/Certification information:  PT Insurance Information: BCBS (50 visits PCY; $30 copay each visit)  Physician Information:  Referring Practitioner: Hillary Nguyen MD  Plan of care signed (Y/N): []  Yes [x]  No     Date of Patient follow up with Physician:      Progress Report: [x]  Yes  []  No     Date Range for reporting period:  Beginning   Ending-    Progress report due (10 Rx/or 30 days whichever is less): 3/55     Recertification due (POC duration/ or 90 days whichever is less):3/9     Visit # Insurance Allowable Auth required? Date Range   3 50 []  Yes  [x]  No -     Latex Allergy:  [x]NO      []YES  Preferred Language for Healthcare:   [x]English       []other:    Functional Scale: LEFS: 63  Date assessed:    Pain level:  0-3/10     SUBJECTIVE: States knee is getting stronger but still has pain with random motions. Reports feeling bilateral hip soreness since starting exercises, unsure if soreness is joint or muscle soreness.      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 Bleeding controlled and dressed on arrival. Wound approx 2 cm with small amount of tissue protruding from wound opening. Pt denies other injuries. Sensation in tact with +pain on palpation. Reports last tetanus x3 years ago.   providing skilled therapy interventions, but not providing any significant amount of measurable one-on-one time to either patient, for improvements in  [] LE / lumbar: LE, proximal hip, and core control in self care, mobility, lifting, ambulation and eccentric single leg control. [] UE / cervical: cervical, scapular, scapulothoracic and UE control with self care, reaching, carrying, lifting, house/yardwork, driving, computer work. NMR and Therapeutic Activities:    [x] (61523 or 83549) Provided verbal/tactile cueing for activities related to improving balance, coordination, kinesthetic sense, posture, motor skill, proprioception and motor activation to allow for proper function of   [x] LE: / Lumbar core, proximal hip and LE with self care and ADLs  [] UE / Cervical: cervical, postural, scapular, scapulothoracic and UE control with self care, carrying, lifting, driving, computer work.   [] (63425) Gait Re-education- Provided training and instruction to the patient for proper LE, core and proximal hip recruitment and positioning and eccentric body weight control with ambulation re-education including up and down stairs     Home Management Training / Self Care:  [] (80057) Provided self-care/home management training related to activities of daily living and compensatory training, and/or use of adaptive equipment for improvement with: ADLs and compensatory training, meal preparation, safety procedures and instruction in use of adaptive equipment, including bathing, grooming, dressing, personal hygiene, basic household cleaning and chores.      Home Exercise Program:    [x] (76770) Reviewed/Progressed HEP activities related to strengthening, flexibility, endurance, ROM of   [x] LE / Lumbar: core, proximal hip and LE for functional self-care, mobility, lifting and ambulation/stair navigation   [] UE / Cervical: cervical, postural, scapular, scapulothoracic and UE control with self care, reaching, carrying, lifting, house/yardwork, driving, computer work  [] (85388)Reviewed/Progressed HEP activities related to improving balance, coordination, kinesthetic sense, posture, motor skill, proprioception of   [] LE: core, proximal hip and LE for self care, mobility, lifting, and ambulation/stair navigation    [] UE / Cervical: cervical, postural,  scapular, scapulothoracic and UE control with self care, reaching, carrying, lifting, house/yardwork, driving, computer work    Manual Treatments:  PROM / STM / Oscillations-Mobs:  G-I, II, III, IV (PA's, Inf., Post.)  [] ([id number]) Provided manual therapy to mobilize LE, proximal hip and/or LS spine soft tissue/joints for the purpose of modulating pain, promoting relaxation,  increasing ROM, reducing/eliminating soft tissue swelling/inflammation/restriction, improving soft tissue extensibility and allowing for proper ROM for normal function with   [] LE / lumbar: self care, mobility, lifting and ambulation. [] UE / Cervical: self care, reaching, carrying, lifting, house/yardwork, driving, computer work. Modalities:  [] (75883) Vasopneumatic compression: Utilized vasopneumatic compression to decrease edema / swelling for the purpose of improving mobility and quad tone / recruitment which will allow for increased overall function including but not limited to self-care, transfers, ambulation, and ascending / descending stairs.        Modalities:      Charges:  Timed Code Treatment Minutes: 50   Total Treatment Minutes: 65     [] EVAL - LOW (62144)   [] EVAL - MOD (97129)  [] EVAL - HIGH (01190)  [] RE-EVAL (65114)  [x] YF(17549) x 2        [] Ionto  [] NMR (19800) x       [] Vaso  [] Manual (00640) x       [] Ultrasound  [x] TA x  1      [] Mech Traction (82683)  [] Aquatic Therapy x      [] ES (un) (61076):   [] Home Management Training x  [] ES(attended) (38492)   [] Dry Needling 1-2 muscles (72535):  [] Dry Needling 3+ muscles (017797  [] Group:      [] Other:     GOALS:  Patient stated goal: improve strength and balance  [] Progressing: [] Met: [] Not Met: [] Adjusted    Therapist goals for Patient:   Short Term Goals: To be achieved in: 2 weeks  1. Independent in HEP and progression per patient tolerance, in order to prevent re-injury. [] Progressing: [] Met: [] Not Met: [] Adjusted  2. Patient will have a decrease in pain to facilitate improvement in movement, function, and ADLs as indicated by Functional Deficits. [] Progressing: [] Met: [] Not Met: [] Adjusted    Long Term Goals: To be achieved in: 6 weeks  1. Disability index score of 30% or less for the LEFS to assist with reaching prior level of function. [] Progressing: [] Met: [] Not Met: [] Adjusted  2. Patient will demonstrate increased AROM to 5-130 to allow for proper joint functioning as indicated by patients Functional Deficits. [] Progressing: [] Met: [] Not Met: [] Adjusted  3. Patient will demonstrate an increase in Strength to at least 4+ as well as good proximal hip strength and control to allow for proper functional mobility as indicated by patients Functional Deficits. [] Progressing: [] Met: [] Not Met: [] Adjusted  4. Patient will return to functional activities including stairs and ladder climbing without increased symptoms or restriction. [] Progressing: [] Met: [] Not Met: [] Adjusted    Overall Progression Towards Functional goals/ Treatment Progress Update:  [] Patient is progressing as expected towards functional goals listed. [] Progression is slowed due to complexities/Impairments listed. [] Progression has been slowed due to co-morbidities.   [x] Plan just implemented, too soon to assess goals progression <30days   [] Goals require adjustment due to lack of progress  [] Patient is not progressing as expected and requires additional follow up with physician  [] Other    Persisting Functional Limitations/Impairments:  []Sleeping []Sitting               [x]Standing []Transfers        [x]Walking []Kneeling               [x]Stairs []Squatting / bending   []ADLs []Reaching  []Lifting  []Housework  []Driving [x]Job related tasks  []Sports/Recreation []Other:        ASSESSMENT:  Upgrades made to program this date as noted in bold on above flow sheet. Pt struggled with upgrades especially with eccentric control of step downs and with single leg balance. Pt demonstrates decreased eccentric control and proprioception with muscle fatigue noted by end of session. Pt denies increased pain with upgrades but does report having muscle fatigue with current routine. Continue to upgrade exercises as tolerated to promote improving strength and balance in order to promote increased functional mobility and endurance with daily tasks. Treatment/Activity Tolerance:  [x] Patient able to complete tx  [] Patient limited by fatigue  [] Patient limited by pain  [] Patient limited by other medical complications  [] Other:     Prognosis: [x] Good [] Fair  [] Poor     Patient Requires Follow-up: [x] Yes  [] No    Plan for next treatment session: LE strength, flexibility progressions    PLAN: See eval. PT 1-2x / week for 4-6 weeks. [] Continue per plan of care [] Alter current plan (see comments)  [x] Plan of care initiated [] Hold pending MD visit [] Discharge    Electronically signed by: Parris Garber    Note: If patient does not return for scheduled/ recommended follow up visits, his note will serve as a discharge from care along with most recent update on progress.

## 2020-10-05 ENCOUNTER — PATIENT MESSAGE (OUTPATIENT)
Dept: ADMINISTRATIVE | Facility: HOSPITAL | Age: 50
End: 2020-10-05

## 2021-01-05 ENCOUNTER — PATIENT MESSAGE (OUTPATIENT)
Dept: ADMINISTRATIVE | Facility: HOSPITAL | Age: 51
End: 2021-01-05

## 2023-01-10 ENCOUNTER — OCCUPATIONAL HEALTH (OUTPATIENT)
Dept: URGENT CARE | Facility: CLINIC | Age: 53
End: 2023-01-10

## 2023-01-10 PROCEDURE — 80305 DRUG TEST PRSMV DIR OPT OBS: CPT | Mod: S$GLB,,,

## 2023-01-10 PROCEDURE — 80305 PR DRUG SCREEN - 1: ICD-10-PCS | Mod: S$GLB,,,

## 2023-02-26 ENCOUNTER — HOSPITAL ENCOUNTER (EMERGENCY)
Facility: HOSPITAL | Age: 53
Discharge: HOME | End: 2023-02-27
Attending: STUDENT IN AN ORGANIZED HEALTH CARE EDUCATION/TRAINING PROGRAM
Payer: COMMERCIAL

## 2023-02-26 DIAGNOSIS — R50.9 FEVER, UNSPECIFIED FEVER CAUSE: Primary | ICD-10-CM

## 2023-02-26 DIAGNOSIS — B34.9 VIRAL SYNDROME: ICD-10-CM

## 2023-02-26 DIAGNOSIS — R05.1 ACUTE COUGH: ICD-10-CM

## 2023-02-26 LAB
BASOPHILS # BLD AUTO: 0.01 K/UL (ref 0–0.2)
BASOPHILS NFR BLD: 0.1 % (ref 0–1.9)
DIFFERENTIAL METHOD: ABNORMAL
EOSINOPHIL # BLD AUTO: 0 K/UL (ref 0–0.5)
EOSINOPHIL NFR BLD: 0.3 % (ref 0–8)
ERYTHROCYTE [DISTWIDTH] IN BLOOD BY AUTOMATED COUNT: 12.4 % (ref 11.5–14.5)
GROUP A STREP, MOLECULAR: NEGATIVE
HCT VFR BLD AUTO: 40.7 % (ref 40–54)
HGB BLD-MCNC: 14.2 G/DL (ref 14–18)
IMM GRANULOCYTES # BLD AUTO: 0.05 K/UL (ref 0–0.04)
IMM GRANULOCYTES NFR BLD AUTO: 0.4 % (ref 0–0.5)
INFLUENZA A, MOLECULAR: NEGATIVE
INFLUENZA B, MOLECULAR: NEGATIVE
LYMPHOCYTES # BLD AUTO: 1 K/UL (ref 1–4.8)
LYMPHOCYTES NFR BLD: 7.6 % (ref 18–48)
MCH RBC QN AUTO: 30.5 PG (ref 27–31)
MCHC RBC AUTO-ENTMCNC: 34.9 G/DL (ref 32–36)
MCV RBC AUTO: 88 FL (ref 82–98)
MONOCYTES # BLD AUTO: 1.1 K/UL (ref 0.3–1)
MONOCYTES NFR BLD: 8 % (ref 4–15)
NEUTROPHILS # BLD AUTO: 11.2 K/UL (ref 1.8–7.7)
NEUTROPHILS NFR BLD: 83.6 % (ref 38–73)
NRBC BLD-RTO: 0 /100 WBC
PLATELET # BLD AUTO: 160 K/UL (ref 150–450)
PMV BLD AUTO: 9.8 FL (ref 9.2–12.9)
RBC # BLD AUTO: 4.65 M/UL (ref 4.6–6.2)
SARS-COV-2 RDRP RESP QL NAA+PROBE: NEGATIVE
SPECIMEN SOURCE: NORMAL
WBC # BLD AUTO: 13.41 K/UL (ref 3.9–12.7)

## 2023-02-26 PROCEDURE — 87040 BLOOD CULTURE FOR BACTERIA: CPT | Performed by: STUDENT IN AN ORGANIZED HEALTH CARE EDUCATION/TRAINING PROGRAM

## 2023-02-26 PROCEDURE — 83605 ASSAY OF LACTIC ACID: CPT | Performed by: STUDENT IN AN ORGANIZED HEALTH CARE EDUCATION/TRAINING PROGRAM

## 2023-02-26 PROCEDURE — 36415 COLL VENOUS BLD VENIPUNCTURE: CPT | Performed by: STUDENT IN AN ORGANIZED HEALTH CARE EDUCATION/TRAINING PROGRAM

## 2023-02-26 PROCEDURE — 84484 ASSAY OF TROPONIN QUANT: CPT | Performed by: STUDENT IN AN ORGANIZED HEALTH CARE EDUCATION/TRAINING PROGRAM

## 2023-02-26 PROCEDURE — 87502 INFLUENZA DNA AMP PROBE: CPT | Performed by: STUDENT IN AN ORGANIZED HEALTH CARE EDUCATION/TRAINING PROGRAM

## 2023-02-26 PROCEDURE — 99284 EMERGENCY DEPT VISIT MOD MDM: CPT | Mod: 25

## 2023-02-26 PROCEDURE — 87651 STREP A DNA AMP PROBE: CPT | Performed by: STUDENT IN AN ORGANIZED HEALTH CARE EDUCATION/TRAINING PROGRAM

## 2023-02-26 PROCEDURE — 85025 COMPLETE CBC W/AUTO DIFF WBC: CPT | Performed by: STUDENT IN AN ORGANIZED HEALTH CARE EDUCATION/TRAINING PROGRAM

## 2023-02-26 PROCEDURE — U0002 COVID-19 LAB TEST NON-CDC: HCPCS | Performed by: STUDENT IN AN ORGANIZED HEALTH CARE EDUCATION/TRAINING PROGRAM

## 2023-02-26 PROCEDURE — 80053 COMPREHEN METABOLIC PANEL: CPT | Performed by: STUDENT IN AN ORGANIZED HEALTH CARE EDUCATION/TRAINING PROGRAM

## 2023-02-26 NOTE — Clinical Note
"Cherrymauricio "Oz Valero was seen and treated in our emergency department on 2/26/2023.  He may return to work on 03/01/2023.       If you have any questions or concerns, please don't hesitate to call.      Saulo Escudero MD"

## 2023-02-27 VITALS
HEART RATE: 85 BPM | DIASTOLIC BLOOD PRESSURE: 72 MMHG | WEIGHT: 157.75 LBS | TEMPERATURE: 99 F | RESPIRATION RATE: 20 BRPM | OXYGEN SATURATION: 90 % | SYSTOLIC BLOOD PRESSURE: 136 MMHG | BODY MASS INDEX: 23.98 KG/M2

## 2023-02-27 LAB
ALBUMIN SERPL BCP-MCNC: 3.3 G/DL (ref 3.5–5.2)
ALP SERPL-CCNC: 61 U/L (ref 55–135)
ALT SERPL W/O P-5'-P-CCNC: 11 U/L (ref 10–44)
ANION GAP SERPL CALC-SCNC: 13 MMOL/L (ref 8–16)
AST SERPL-CCNC: 18 U/L (ref 10–40)
BILIRUB SERPL-MCNC: 1.2 MG/DL (ref 0.1–1)
BUN SERPL-MCNC: 10 MG/DL (ref 6–20)
CALCIUM SERPL-MCNC: 8.6 MG/DL (ref 8.7–10.5)
CHLORIDE SERPL-SCNC: 104 MMOL/L (ref 95–110)
CO2 SERPL-SCNC: 20 MMOL/L (ref 23–29)
CREAT SERPL-MCNC: 1.4 MG/DL (ref 0.5–1.4)
EST. GFR  (NO RACE VARIABLE): 60 ML/MIN/1.73 M^2
GLUCOSE SERPL-MCNC: 132 MG/DL (ref 70–110)
LACTATE SERPL-SCNC: 1.9 MMOL/L (ref 0.5–2.2)
POTASSIUM SERPL-SCNC: 3.3 MMOL/L (ref 3.5–5.1)
PROT SERPL-MCNC: 6.4 G/DL (ref 6–8.4)
SODIUM SERPL-SCNC: 137 MMOL/L (ref 136–145)
TROPONIN I SERPL DL<=0.01 NG/ML-MCNC: <0.006 NG/ML (ref 0–0.03)

## 2023-02-27 PROCEDURE — 96372 THER/PROPH/DIAG INJ SC/IM: CPT | Performed by: STUDENT IN AN ORGANIZED HEALTH CARE EDUCATION/TRAINING PROGRAM

## 2023-02-27 PROCEDURE — 25000003 PHARM REV CODE 250: Performed by: STUDENT IN AN ORGANIZED HEALTH CARE EDUCATION/TRAINING PROGRAM

## 2023-02-27 PROCEDURE — 63600175 PHARM REV CODE 636 W HCPCS: Performed by: STUDENT IN AN ORGANIZED HEALTH CARE EDUCATION/TRAINING PROGRAM

## 2023-02-27 RX ORDER — KETOROLAC TROMETHAMINE 30 MG/ML
30 INJECTION, SOLUTION INTRAMUSCULAR; INTRAVENOUS
Status: COMPLETED | OUTPATIENT
Start: 2023-02-27 | End: 2023-02-27

## 2023-02-27 RX ORDER — KETOROLAC TROMETHAMINE 30 MG/ML
15 INJECTION, SOLUTION INTRAMUSCULAR; INTRAVENOUS
Status: DISCONTINUED | OUTPATIENT
Start: 2023-02-27 | End: 2023-02-27

## 2023-02-27 RX ORDER — ACETAMINOPHEN 500 MG
1000 TABLET ORAL
Status: COMPLETED | OUTPATIENT
Start: 2023-02-27 | End: 2023-02-27

## 2023-02-27 RX ORDER — BROMPHENIRAMINE MALEATE, PSEUDOEPHEDRINE HYDROCHLORIDE, AND DEXTROMETHORPHAN HYDROBROMIDE 2; 30; 10 MG/5ML; MG/5ML; MG/5ML
10 SYRUP ORAL EVERY 6 HOURS
Qty: 473 ML | Refills: 0 | Status: SHIPPED | OUTPATIENT
Start: 2023-02-27

## 2023-02-27 RX ADMIN — KETOROLAC TROMETHAMINE 30 MG: 30 INJECTION, SOLUTION INTRAMUSCULAR; INTRAVENOUS at 12:02

## 2023-02-27 RX ADMIN — ACETAMINOPHEN 1000 MG: 500 TABLET ORAL at 12:02

## 2023-02-27 NOTE — ED PROVIDER NOTES
Encounter Date: 2023       History     Chief Complaint   Patient presents with    Fever     Fever, cough, body aches, headache, no taste or smell     Reviewed records from primary care and [facility]    This is a 53-year-old male with history per medical record of TIA, hypertension, polysubstance abuse.  Presenting today for evaluation of fever, cough that is productive, body aches, and headache.  Was in big groups of people for [place].      The history is provided by the patient.   Review of patient's allergies indicates:  No Known Allergies  Past Medical History:   Diagnosis Date    Hypertension     Patient denies medical problems     Unspecified disorder of kidney and ureter      Past Surgical History:   Procedure Laterality Date    none       Family History   Problem Relation Age of Onset    Diabetes Mother     Hypertension Mother     Unexplained death Father         Dad- when patient young unknown cause     Social History     Tobacco Use    Smoking status: Never    Smokeless tobacco: Never   Substance Use Topics    Alcohol use: Yes     Comment: occasionally    Drug use: No     Review of Systems   All other systems reviewed and are negative.    Physical Exam     Initial Vitals [23 2300]   BP Pulse Resp Temp SpO2   (!) 145/91 96 (!) 28 (!) 103.1 °F (39.5 °C) (!) 92 %      MAP       --         Physical Exam    Nursing note and vitals reviewed.  Constitutional: He appears well-developed and well-nourished. No distress.   HENT:   Head: Normocephalic and atraumatic.   Right Ear: External ear normal.   Left Ear: External ear normal.   Nose: Nose normal.   Mouth/Throat: Oropharynx is clear and moist.   Eyes: Conjunctivae and EOM are normal. Pupils are equal, round, and reactive to light.   Neck: Neck supple.   Normal range of motion.  Cardiovascular:  Normal rate and regular rhythm.           No murmur heard.  Pulmonary/Chest: Breath sounds normal. No stridor. No respiratory distress. He has no  wheezes. He has no rhonchi. He has no rales.   Abdominal: Abdomen is soft. Bowel sounds are normal. There is no abdominal tenderness.   Musculoskeletal:         General: No edema. Normal range of motion.      Cervical back: Normal range of motion and neck supple.     Neurological: He is alert and oriented to person, place, and time. He has normal strength. No cranial nerve deficit or sensory deficit.   Skin: Skin is warm and dry. No rash noted.   Psychiatric: He has a normal mood and affect. Thought content normal.       ED Course   Procedures  Labs Reviewed   CBC W/ AUTO DIFFERENTIAL - Abnormal; Notable for the following components:       Result Value    WBC 13.41 (*)     Gran # (ANC) 11.2 (*)     Immature Grans (Abs) 0.05 (*)     Mono # 1.1 (*)     Gran % 83.6 (*)     Lymph % 7.6 (*)     All other components within normal limits   COMPREHENSIVE METABOLIC PANEL - Abnormal; Notable for the following components:    Potassium 3.3 (*)     CO2 20 (*)     Glucose 132 (*)     Calcium 8.6 (*)     Albumin 3.3 (*)     Total Bilirubin 1.2 (*)     All other components within normal limits   INFLUENZA A & B BY MOLECULAR   GROUP A STREP, MOLECULAR   CULTURE, BLOOD   CULTURE, BLOOD   TROPONIN I   LACTIC ACID, PLASMA   SARS-COV-2 RNA AMPLIFICATION, QUAL          Imaging Results              X-Ray Chest AP Portable (Final result)  Result time 02/26/23 23:44:55      Final result by Virgil Maldonado DO (02/26/23 23:44:55)                   Impression:      No acute abnormality.      Electronically signed by: Virgil Maldonado  Date:    02/26/2023  Time:    23:44               Narrative:    EXAMINATION:  XR CHEST AP PORTABLE    CLINICAL HISTORY:  cough + Dyspnea;    TECHNIQUE:  Single frontal view of the chest was performed.    COMPARISON:  None    FINDINGS:  The lungs are well expanded and clear. No focal opacities are seen. The pleural spaces are clear.    The cardiac silhouette is unremarkable.    The visualized osseous structures are  unremarkable.                                    X-Rays:   Independently Interpreted Readings:   Chest X-Ray: No acute abnormalities.   Medications   acetaminophen tablet 1,000 mg (1,000 mg Oral Given 2/27/23 0055)   ketorolac injection 30 mg (30 mg Intramuscular Given 2/27/23 0055)     Medical Decision Making:   Independently Interpreted Test(s):   I have ordered and independently interpreted X-rays - see prior notes.  Clinical Tests:   Lab Tests: Ordered and Reviewed  Radiological Study: Ordered and Reviewed  ED Management:  53-year-old male presents with symptoms that would be consistent with viral type syndrome.  Patient without complaint of shortness of breath.  No increased work of breathing on exam.  Clear lung sounds bilaterally.  Normal chest x-ray.  Symptoms improved with treatment in the emergency department.  Patient ambulating through the emergency department without issue.  Given benign presentation, exam, and workup, much lower suspicion for more life-threatening causes such as ACS, PE, dissection, bacteremia, pneumonia.  Patient is to continue with symptomatic and supportive care at home.  Return precautions given for worsening or changing symptoms.    Additional MDM:     Well's Criteria Score:  -Clinical symptoms of DVT (leg swelling, pain with palpation) = 0.0  -Other diagnosis less likely than pulmonary embolism =            0.0  -Heart Rate >100 =   0.0  -Immobilization (= or > than 3 days) or surgery in the previous 4 weeks = 0.0  -Previous DVT/PE = 0.0  -Hemoptysis =          0.0  -Malignancy =           0.0  Well's Probability Score =    0                         Clinical Impression:   Final diagnoses:  [R50.9] Fever, unspecified fever cause (Primary)  [B34.9] Viral syndrome  [R05.1] Acute cough        ED Disposition Condition    Discharge Stable          ED Prescriptions       Medication Sig Dispense Start Date End Date Auth. Provider    brompheniramine-pseudoeph-DM (BROMFED DM) 2-30-10 mg/5  mL Syrp Take 10 mLs by mouth every 6 (six) hours. 473 mL 2/27/2023 -- Saulo Escudero MD          Follow-up Information       Follow up With Specialties Details Why Contact Info    Talha Case MD Family Medicine Schedule an appointment as soon as possible for a visit in 2 days For follow-up on today's visit. [address] [facility]  [place] [address]  [phone]      [facility] Emergency Dept Emergency Medicine Go to  As needed, If symptoms worsen [address] [place] [id number] [phone]             Saulo Escudero MD  02/27/23 0642

## 2023-03-04 LAB
BACTERIA BLD CULT: NORMAL
BACTERIA BLD CULT: NORMAL

## 2023-08-21 ENCOUNTER — HOSPITAL ENCOUNTER (EMERGENCY)
Facility: HOSPITAL | Age: 53
Discharge: HOME | End: 2023-08-21
Attending: EMERGENCY MEDICINE
Payer: COMMERCIAL

## 2023-08-21 VITALS
DIASTOLIC BLOOD PRESSURE: 97 MMHG | SYSTOLIC BLOOD PRESSURE: 160 MMHG | BODY MASS INDEX: 25.31 KG/M2 | WEIGHT: 167 LBS | TEMPERATURE: 99 F | HEIGHT: 68 IN | RESPIRATION RATE: 19 BRPM | OXYGEN SATURATION: 95 % | HEART RATE: 54 BPM

## 2023-08-21 DIAGNOSIS — R10.13 EPIGASTRIC ABDOMINAL PAIN: Primary | ICD-10-CM

## 2023-08-21 DIAGNOSIS — I10 HYPERTENSION, UNSPECIFIED TYPE: ICD-10-CM

## 2023-08-21 DIAGNOSIS — R10.9 ABDOMINAL PAIN: ICD-10-CM

## 2023-08-21 LAB
ALBUMIN SERPL BCP-MCNC: 3.7 G/DL (ref 3.5–5.2)
ALP SERPL-CCNC: 83 U/L (ref 55–135)
ALT SERPL W/O P-5'-P-CCNC: 49 U/L (ref 10–44)
ANION GAP SERPL CALC-SCNC: 7 MMOL/L (ref 8–16)
AST SERPL-CCNC: 64 U/L (ref 10–40)
BASOPHILS # BLD AUTO: 0.01 K/UL (ref 0–0.2)
BASOPHILS NFR BLD: 0.2 % (ref 0–1.9)
BILIRUB SERPL-MCNC: 0.3 MG/DL (ref 0.1–1)
BILIRUB UR QL STRIP: NEGATIVE
BUN SERPL-MCNC: 13 MG/DL (ref 6–20)
CALCIUM SERPL-MCNC: 8.7 MG/DL (ref 8.7–10.5)
CHLORIDE SERPL-SCNC: 107 MMOL/L (ref 95–110)
CLARITY UR: CLEAR
CO2 SERPL-SCNC: 27 MMOL/L (ref 23–29)
COLOR UR: YELLOW
CREAT SERPL-MCNC: 1.2 MG/DL (ref 0.5–1.4)
DIFFERENTIAL METHOD: ABNORMAL
EOSINOPHIL # BLD AUTO: 0.2 K/UL (ref 0–0.5)
EOSINOPHIL NFR BLD: 3.9 % (ref 0–8)
ERYTHROCYTE [DISTWIDTH] IN BLOOD BY AUTOMATED COUNT: 13.5 % (ref 11.5–14.5)
EST. GFR  (NO RACE VARIABLE): >60 ML/MIN/1.73 M^2
GLUCOSE SERPL-MCNC: 91 MG/DL (ref 70–110)
GLUCOSE UR QL STRIP: NEGATIVE
HCT VFR BLD AUTO: 34.1 % (ref 40–54)
HGB BLD-MCNC: 12 G/DL (ref 14–18)
HGB UR QL STRIP: NEGATIVE
IMM GRANULOCYTES # BLD AUTO: 0.01 K/UL (ref 0–0.04)
IMM GRANULOCYTES NFR BLD AUTO: 0.2 % (ref 0–0.5)
KETONES UR QL STRIP: NEGATIVE
LEUKOCYTE ESTERASE UR QL STRIP: NEGATIVE
LIPASE SERPL-CCNC: 25 U/L (ref 4–60)
LYMPHOCYTES # BLD AUTO: 1.7 K/UL (ref 1–4.8)
LYMPHOCYTES NFR BLD: 38.3 % (ref 18–48)
MCH RBC QN AUTO: 30.5 PG (ref 27–31)
MCHC RBC AUTO-ENTMCNC: 35.2 G/DL (ref 32–36)
MCV RBC AUTO: 87 FL (ref 82–98)
MONOCYTES # BLD AUTO: 0.4 K/UL (ref 0.3–1)
MONOCYTES NFR BLD: 9.8 % (ref 4–15)
NEUTROPHILS # BLD AUTO: 2.1 K/UL (ref 1.8–7.7)
NEUTROPHILS NFR BLD: 47.6 % (ref 38–73)
NITRITE UR QL STRIP: NEGATIVE
NRBC BLD-RTO: 0 /100 WBC
PH UR STRIP: 6 [PH] (ref 5–8)
PLATELET # BLD AUTO: 166 K/UL (ref 150–450)
PMV BLD AUTO: 10 FL (ref 9.2–12.9)
POTASSIUM SERPL-SCNC: 3.9 MMOL/L (ref 3.5–5.1)
PROT SERPL-MCNC: 6.7 G/DL (ref 6–8.4)
PROT UR QL STRIP: NEGATIVE
RBC # BLD AUTO: 3.93 M/UL (ref 4.6–6.2)
SODIUM SERPL-SCNC: 141 MMOL/L (ref 136–145)
SP GR UR STRIP: 1.01 (ref 1–1.03)
URN SPEC COLLECT METH UR: NORMAL
UROBILINOGEN UR STRIP-ACNC: NEGATIVE EU/DL
WBC # BLD AUTO: 4.39 K/UL (ref 3.9–12.7)

## 2023-08-21 PROCEDURE — 80053 COMPREHEN METABOLIC PANEL: CPT | Performed by: EMERGENCY MEDICINE

## 2023-08-21 PROCEDURE — 81003 URINALYSIS AUTO W/O SCOPE: CPT | Performed by: EMERGENCY MEDICINE

## 2023-08-21 PROCEDURE — 85025 COMPLETE CBC W/AUTO DIFF WBC: CPT | Performed by: EMERGENCY MEDICINE

## 2023-08-21 PROCEDURE — 83690 ASSAY OF LIPASE: CPT | Performed by: EMERGENCY MEDICINE

## 2023-08-21 PROCEDURE — 99284 EMERGENCY DEPT VISIT MOD MDM: CPT | Mod: 25

## 2023-08-21 PROCEDURE — 25000003 PHARM REV CODE 250: Performed by: EMERGENCY MEDICINE

## 2023-08-21 RX ORDER — MAG HYDROX/ALUMINUM HYD/SIMETH 200-200-20
30 SUSPENSION, ORAL (FINAL DOSE FORM) ORAL ONCE
Status: COMPLETED | OUTPATIENT
Start: 2023-08-21 | End: 2023-08-21

## 2023-08-21 RX ORDER — FAMOTIDINE 20 MG/1
20 TABLET, FILM COATED ORAL
Status: COMPLETED | OUTPATIENT
Start: 2023-08-21 | End: 2023-08-21

## 2023-08-21 RX ORDER — GABAPENTIN 300 MG/1
300 CAPSULE ORAL 3 TIMES DAILY
Status: DISCONTINUED | OUTPATIENT
Start: 2023-08-21 | End: 2023-08-21

## 2023-08-21 RX ORDER — LIDOCAINE HYDROCHLORIDE 20 MG/ML
15 SOLUTION OROPHARYNGEAL ONCE
Status: DISCONTINUED | OUTPATIENT
Start: 2023-08-21 | End: 2023-08-21 | Stop reason: RX

## 2023-08-21 RX ORDER — FAMOTIDINE 20 MG/1
20 TABLET, FILM COATED ORAL 2 TIMES DAILY
Qty: 180 TABLET | Refills: 3 | Status: SHIPPED | OUTPATIENT
Start: 2023-08-21 | End: 2024-08-20

## 2023-08-21 RX ORDER — GABAPENTIN 300 MG/1
300 CAPSULE ORAL
Status: COMPLETED | OUTPATIENT
Start: 2023-08-21 | End: 2023-08-21

## 2023-08-21 RX ADMIN — GABAPENTIN 300 MG: 300 CAPSULE ORAL at 06:08

## 2023-08-21 RX ADMIN — FAMOTIDINE 20 MG: 20 TABLET, FILM COATED ORAL at 06:08

## 2023-08-21 RX ADMIN — ALUMINUM HYDROXIDE, MAGNESIUM HYDROXIDE, AND DIMETHICONE 30 ML: 200; 20; 200 SUSPENSION ORAL at 03:08

## 2023-08-21 NOTE — DISCHARGE INSTRUCTIONS
As we discussed, it is important that you return to the ER for any new concerns or symptoms, worsening of your existing symptoms, if you do not completely improve, or if you are unable to be seen by your primary care provider.  An ER visit cannot replace the evaluation by your primary care provider!!    In the emergency department our goal is to rule-out life threatening conditions and make sure that you are safe to be discharged home. Many medical conditions are difficult to diagnose and may be impossible to be diagnosed during a single ER visit. These conditions often start with non-specific symptoms and can only be diagnosed on follow up visits with your primary care physician or specialist when the symptoms continue or change. Please remember that all medical conditions can change, and we cannot predict how you will be feeling tomorrow or the next day, so if you have any worsening or new symptoms, you should not hesitate to return to the emergency department for re-evaluation.        Be sure to follow up with your primary care doctor for a recheck and to review any labs/imaging that were performed today.  It is very common for us to identify non-emergent incidental findings on labs and imaging which must be followed up with your primary care physician. If you do not have a primary care doctor, you may contact the one listed on your discharge paperwork or you may also call the [facility] Appointment Desk at [phone] to schedule an appointment with one.       All medications have side effects.  We have done our best to select a medication for you that will treat your health problem and have the least amount of side effects.  If at any time while or after you take this medication you develops new symptoms or have any concerns, it is important you stop taking the medication and call your primary care provider or return to the emergency department for evaluation.    Do not drive or operate heavy machinery  for 24 hours if you have received any pain medications, sedatives or mood altering drugs during your ER visit.

## 2023-08-21 NOTE — ED PROVIDER NOTES
SCRIBE #1 NOTE: I, Jim Case, am scribing for, and in the presence of,  Latosha Jaime MD. I have scribed the following portions of the note - Other sections scribed: HPI, RICK, PE.           EM PHYSICIAN NOTE       This patient presents with a complaint of   Chief Complaint   Patient presents with    Abdominal Pain     EMS called to 54yo male that has been having upper abdominal pain any time he swallows since this past Friday. Currently a 5/10 but when he swallows it goes to a 10/10. Currently at [facility] for alcohol and crack use. Denied any previous hx of esophageal problems.        HPI: Amanda Valero is a 53 y.o. male with a PMHx of HTN, stroke (2 years ago), presents to the ED for pain when swallowing onset Friday afternoon 8/18/23. Patient reports that he was eating dinner when he noticed the pain when swallowing. He states that he is able to tolerate the pain and further states that he has been eating and drinking normally. He reports that he is currently in rehab for 1 week for EtOH and cocaine. He states that he was initially detoxing at home with no symptoms. Endorses compliance with lisinopril. No other medications taken PTA. No alleviating or exacerbating factors noted. Denies blood in stool, blood in vomit, melena, unexplained weight loss or any associated symptoms. Endorses having a inguinal hernia at 9 y/o but denies any further abdominal surgeries.       Review of patient's allergies indicates:  No Known Allergies    Preferred pharmacy: [facility]        Pertinent REVIEW of SYSTEMS  Source: Patient  The nurse's notes and triage vital signs were reviewed.  GENERAL/CONSTITUTIONAL: See HPI.   CARDIOVASCULAR: There is not a report of chest pain   RESPIRATORY: There is not a report of cough or SOB  GASTROINTESTINAL: There is not a report of  vomiting, diarrhea  HEMATOLOGIC/LYMPHATIC: There is not a report of anticoagulant/antithrombotic use.         PHYSICAL EXAMINATION    ED  "Triage Vitals [08/21/23 1400]   Enc Vitals Group      BP (!) 187/94      Pulse 60      Resp 18      Temp 98.1 °F (36.7 °C)      Temp Source Oral      SpO2 95 %      Weight 167 lb      Height 5' 8"      Head Circumference       Peak Flow       Pain Score       Pain Loc       Pain Edu?       Excl. in GC?      Vital signs and Pulse Ox reviewed in clinical context. Abnormalities noted:  Hypertension noted.  Patient does take amlodipine and lisinopril at home  Pt's level of consciousness is Awake and Alert, and the patient is in no distress.  Skin: warm, pink and dry.  Capillary refill is less than 2 seconds.  Mucosa: normal  Head and Neck: no JVD, neck supple  Cardiac exam: RRR   Pulmonary exam: unlabored and clear  Abd Exam: soft nontender   Musculoskeletal: no joint tenderness, deformity or swelling   Neurologic: GCS 15; moving all extremities equally, no facial droop       Medical decision making:   Nurses notes and Vital Signs reviewed.     Problems: Today's visit reveals right upper quadrant pain which is a/an Acute problem     Other problems today include elevated blood pressure not at goal     MDM Components integrated into this visit: Social determinants of health impacting care today: Compliance with medications impaired due to poor access to care / affordable    Considerations: My decision to discharge home this patient is based on results of the workup and tolerating p.o...          See ER course below for lab test ordered, results reviewed, independent interpretation of images or EKG, discussion with consultants, data obtained from sources other than patient:  ED Course as of 08/21/23 1818   Mon Aug 21, 2023   1443 Is a 53-year-old gentleman who reports sharp right-sided upper abdominal pain when he swallows liquids or food for the past 4 days.  He is not sure what started this but noticed it the 1st time 4 days ago and since then every time he eats anything has pain.  He is able to tolerate eating.  He had " a sandwich just prior to arrival.  No unexplained weight loss.  No fevers or chills.  No dark tarry stools. [MH]   1723 Lipase is normal [MH]   1724 CMP reveals mild elevation of the AST and ALT at 64 and 49.  Renal function is normal.   [MH]   1724 CBC is normal.  When compared to prior labs on chart review, there is no acute changes. [MH]   1724 UA is normal [MH]   1724 Chest x-ray is normal [MH]   1806 Patient is requesting his scheduled dose of gabapentin and is ready for discharge. []      ED Course User Index  [] Latosha Jaime MD          Orders Placed This Encounter   Procedures    X-Ray Chest 1 View    Comprehensive Metabolic Panel    CBC Auto Differential    Urinalysis    Lipase    Ambulatory referral/consult to Gastroenterology    Nursing communication         Diagnoses that have been ruled out:   None   Diagnoses that are still under consideration:   None   Final diagnoses:   Abdominal pain   Epigastric abdominal pain   Hypertension, unspecified type               Referral for follow-up  Follow-up Information       Follow up With Specialties Details Why Contact Info    Talha Case MD Family Medicine  Call to schedule an appointment [address] [facility]  [place] [address]  [phone]              Prescription management:  ED Prescriptions       Medication Sig Dispense Start Date End Date Auth. Provider    famotidine (PEPCID) 20 MG tablet Take 1 tablet (20 mg total) by mouth 2 (two) times daily. 180 tablet 8/21/2023 8/20/2024 Latosha Jaime MD Haydel, Micelle J., MD      This note was created using Dict[employer].  This program may occasionally misinterpret certain words and phrases.      SCRIBE ATTESTATION NOTE:   I attest that I personally performed the services documented by the scribe and acknowledged and confirm the content of the note.   Nurses notes were reviewed.  Latosha Weir MD  08/21/23 1727       [place]  Latosha HARRINGTON MD  08/21/23 1811

## 2023-08-21 NOTE — ED NOTES
"Pt w/ admittance to [facility] detox last Tues for ETOH and cocaine abuse presents today w/ c/o RUQ abd pain after swallowing solid or liquid.  Pain is "sharp", non-radiating and only last "couple of seconds. Also c/o heartburn and reflux.  Denies diarrhea or fever.  Pt is AAOx3, resp even and unlabored, skin warm and dry.  No tremors or anxiety noted.  Pt states "doing great" with his rehab.  HOB elevated, SR up x 2, Call bell within reach.  NAD noted.   "